# Patient Record
Sex: MALE | URBAN - METROPOLITAN AREA
[De-identification: names, ages, dates, MRNs, and addresses within clinical notes are randomized per-mention and may not be internally consistent; named-entity substitution may affect disease eponyms.]

---

## 2021-11-05 ENCOUNTER — IMPORTED ENCOUNTER (OUTPATIENT)
Dept: URBAN - METROPOLITAN AREA CLINIC 31 | Facility: CLINIC | Age: 81
End: 2021-11-05

## 2021-11-05 PROBLEM — H35.3132: Noted: 2021-11-05

## 2021-11-05 PROBLEM — Z96.1: Noted: 2021-11-05

## 2021-11-05 PROBLEM — H59.031: Noted: 2021-11-05

## 2021-11-05 PROBLEM — H31.011: Noted: 2021-11-05

## 2021-11-05 PROBLEM — H43.811: Noted: 2021-11-05

## 2021-11-05 PROBLEM — H25.12: Noted: 2021-11-05

## 2021-11-05 PROBLEM — H02.035: Noted: 2021-11-05

## 2021-11-05 PROCEDURE — 92015 DETERMINE REFRACTIVE STATE: CPT

## 2021-11-05 PROCEDURE — 99204 OFFICE O/P NEW MOD 45 MIN: CPT

## 2021-11-05 NOTE — PATIENT DISCUSSION
Entropion Left Lower Lid:  The patient has an inturned left lower eyelid. The patient is  experiencing symptoms and/or there is rubbing of the lashes against the cornea.   Refer to [facility] in [place] specialist.

## 2021-11-05 NOTE — PATIENT DISCUSSION
1.  Nuclear Sclerotic Cataract OS: Discussed the risks benefits alternatives and limitations of cataract surgery including infection bleeding loss of vision retinal tears detachment. The patient stated a full understanding and a desire to proceed with the procedure in the left eye. Refractive options were reviewed. Patient has elected to be optimized for distance vision in the left eye. The patient will still need glasses for reading and to possibly fine tune distance vision. Scheduel KPE/IOL OS will need full Admit. 2 mos after entropion repair2. Pseudophakia OD - IOL stable. Monitor for changes in vision. 3. Entropion Left Lower Lid:  The patient has an inturned left lower eyelid. The patient is  experiencing symptoms and/or there is rubbing of the lashes against the cornea. Schedule LLL entropion repair4. 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 Butt [place] 6.  Macula scar OD:7.  PVD OD:OLDReturn for an appointment for Admit. with Dr. Ana Paula Crouch.  needs full Admit

## 2022-04-02 ASSESSMENT — VISUAL ACUITY
OS_PH: SC 20/40
OD_CC: 20/30-1
OS_CC: 20/60-2
OD_CC: 20/50

## 2022-04-02 ASSESSMENT — TONOMETRY
OD_IOP_MMHG: 13
OS_IOP_MMHG: 13